# Patient Record
Sex: MALE | Race: WHITE | Employment: FULL TIME | URBAN - METROPOLITAN AREA
[De-identification: names, ages, dates, MRNs, and addresses within clinical notes are randomized per-mention and may not be internally consistent; named-entity substitution may affect disease eponyms.]

---

## 2021-07-25 ENCOUNTER — HOSPITAL ENCOUNTER (EMERGENCY)
Age: 21
Discharge: HOME | End: 2021-07-25
Attending: EMERGENCY MEDICINE
Payer: COMMERCIAL

## 2021-07-25 ENCOUNTER — APPOINTMENT (OUTPATIENT)
Dept: GENERAL RADIOLOGY | Age: 21
End: 2021-07-25
Payer: COMMERCIAL

## 2021-07-25 VITALS
SYSTOLIC BLOOD PRESSURE: 143 MMHG | RESPIRATION RATE: 14 BRPM | DIASTOLIC BLOOD PRESSURE: 88 MMHG | TEMPERATURE: 97.6 F | OXYGEN SATURATION: 98 % | HEART RATE: 71 BPM | HEIGHT: 73 IN

## 2021-07-25 DIAGNOSIS — R10.12 LEFT UPPER QUADRANT ABDOMINAL PAIN: Primary | ICD-10-CM

## 2021-07-25 LAB
ALBUMIN SERPL-MCNC: 4.4 GM/DL (ref 3.4–5)
ALP BLD-CCNC: 94 IU/L (ref 40–129)
ALT SERPL-CCNC: 48 U/L (ref 10–40)
ANION GAP SERPL CALCULATED.3IONS-SCNC: 14 MMOL/L (ref 4–16)
AST SERPL-CCNC: 34 IU/L (ref 15–37)
BASOPHILS ABSOLUTE: 0 K/CU MM
BASOPHILS RELATIVE PERCENT: 0.1 % (ref 0–1)
BILIRUB SERPL-MCNC: 0.8 MG/DL (ref 0–1)
BUN BLDV-MCNC: 12 MG/DL (ref 6–23)
CALCIUM SERPL-MCNC: 9.1 MG/DL (ref 8.3–10.6)
CHLORIDE BLD-SCNC: 103 MMOL/L (ref 99–110)
CO2: 23 MMOL/L (ref 21–32)
CREAT SERPL-MCNC: 0.8 MG/DL (ref 0.9–1.3)
DIFFERENTIAL TYPE: ABNORMAL
EOSINOPHILS ABSOLUTE: 0 K/CU MM
EOSINOPHILS RELATIVE PERCENT: 0.4 % (ref 0–3)
GFR AFRICAN AMERICAN: >60 ML/MIN/1.73M2
GFR NON-AFRICAN AMERICAN: >60 ML/MIN/1.73M2
GLUCOSE BLD-MCNC: 103 MG/DL (ref 70–99)
HCT VFR BLD CALC: 46.9 % (ref 42–52)
HEMOGLOBIN: 15.6 GM/DL (ref 13.5–18)
IMMATURE NEUTROPHIL %: 0.3 % (ref 0–0.43)
LIPASE: 15 IU/L (ref 13–60)
LYMPHOCYTES ABSOLUTE: 1.3 K/CU MM
LYMPHOCYTES RELATIVE PERCENT: 16.5 % (ref 24–44)
MCH RBC QN AUTO: 30.1 PG (ref 27–31)
MCHC RBC AUTO-ENTMCNC: 33.3 % (ref 32–36)
MCV RBC AUTO: 90.5 FL (ref 78–100)
MONOCYTES ABSOLUTE: 0.4 K/CU MM
MONOCYTES RELATIVE PERCENT: 4.6 % (ref 0–4)
PDW BLD-RTO: 12.5 % (ref 11.7–14.9)
PLATELET # BLD: 205 K/CU MM (ref 140–440)
PMV BLD AUTO: 10.2 FL (ref 7.5–11.1)
POTASSIUM SERPL-SCNC: 3.7 MMOL/L (ref 3.5–5.1)
RBC # BLD: 5.18 M/CU MM (ref 4.6–6.2)
SEGMENTED NEUTROPHILS ABSOLUTE COUNT: 6.1 K/CU MM
SEGMENTED NEUTROPHILS RELATIVE PERCENT: 78.1 % (ref 36–66)
SODIUM BLD-SCNC: 140 MMOL/L (ref 135–145)
TOTAL IMMATURE NEUTOROPHIL: 0.02 K/CU MM
TOTAL PROTEIN: 7.7 GM/DL (ref 6.4–8.2)
WBC # BLD: 7.8 K/CU MM (ref 4–10.5)

## 2021-07-25 PROCEDURE — 99283 EMERGENCY DEPT VISIT LOW MDM: CPT

## 2021-07-25 PROCEDURE — 85025 COMPLETE CBC W/AUTO DIFF WBC: CPT

## 2021-07-25 PROCEDURE — 83690 ASSAY OF LIPASE: CPT

## 2021-07-25 PROCEDURE — 80053 COMPREHEN METABOLIC PANEL: CPT

## 2021-07-25 PROCEDURE — 74022 RADEX COMPL AQT ABD SERIES: CPT

## 2021-07-25 NOTE — ED PROVIDER NOTES
EMERGENCY DEPARTMENT ENCOUNTER      CHIEF COMPLAINT:   Abdominal pain    HPI: Pio Samson is a 24 y.o. male who presents to the Emergency Department complaining of intermittent abdominal pain over the past 2 to 3 days. He states that he has an unusual sensation in the left upper abdomen. It feels like it is popping and gurgling at times. He states it feels empty. Symptoms are intermittent. There are no exacerbating or relieving factors. He is asymptomatic right now. He states he has a history of anxiety and is gotten anxious over this. The patient denies fevers, chills, hematemesis, bloody stools, flank pain, or any other complaints. REVIEW OF SYSTEMS:  CONSTITUTIONAL:  Denies fever, chills, weight loss or weakness  EYES:  Denies photophobia or discharge  ENT:  Denies sore throat or ear pain  CARDIOVASCULAR:  Denies chest pain, palpitations or swelling  RESPIRATORY:  Denies cough or shortness of breath  GI: See HPI  MUSCULOSKELETAL:  Denies back pain  SKIN:  No rash  NEUROLOGIC:  Denies headache, focal weakness or sensory changes  All systems negative except as marked. \"Remaining review of systems reviewed and negative. I have reviewed the nursing triage documentation and agree unless otherwise noted below. \"    PAST MEDICAL HISTORY:   Past Medical History:   Diagnosis Date    Asthma     Celiac disease        CURRENT MEDICATIONS:   Home medications reviewed. SURGICAL HISTORY:   Past Surgical History:   Procedure Laterality Date    CHOLECYSTECTOMY         FAMILY HISTORY:   History reviewed. No pertinent family history.     SOCIAL HISTORY:   Social History     Socioeconomic History    Marital status: Single     Spouse name: Not on file    Number of children: Not on file    Years of education: Not on file    Highest education level: Not on file   Occupational History    Not on file   Tobacco Use    Smoking status: Never Smoker    Smokeless tobacco: Never Used   Vaping Use    Vaping Use: Never used   Substance and Sexual Activity    Alcohol use: Yes     Comment: admits to drinking 5 beers tonight    Drug use: No    Sexual activity: Not on file   Other Topics Concern    Not on file   Social History Narrative    Not on file     Social Determinants of Health     Financial Resource Strain:     Difficulty of Paying Living Expenses:    Food Insecurity:     Worried About Running Out of Food in the Last Year:     [address] in the Last Year:    Transportation Needs:     Lack of Transportation (Medical):  Lack of Transportation (Non-Medical):    Physical Activity:     Days of Exercise per Week:     Minutes of Exercise per Session:    Stress:     Feeling of Stress :    Social Connections:     Frequency of Communication with Friends and Family:     Frequency of Social Gatherings with Friends and Family:     Attends Protestant Services:     Active Member of Clubs or Organizations:     Attends Club or Organization Meetings:     Marital Status:    Intimate Partner Violence:     Fear of Current or Ex-Partner:     Emotionally Abused:     Physically Abused:     Sexually Abused: ALLERGIES: Gluten meal    PHYSICAL EXAM:  VITAL SIGNS:   ED Triage Vitals [07/25/21 0827]   Enc Vitals Group      BP       Pulse 71      Resp       Temp 97.6 °F (36.4 °C)      Temp Source Oral      SpO2       Weight       Height       Head Circumference       Peak Flow       Pain Score       Pain Loc       Pain Edu? Excl. in [address]? Constitutional:  Non-toxic appearance  HENT: Normocephalic, Atraumatic, Bilateral external ears normal, Oropharynx moist, No oral exudates, Nose normal.  Eyes: PERRL, conjunctiva normal   Neck: Normal range of motion, No tenderness, Supple, No stridor,No lymphadenopathy   Cardiovascular:  Normal heart rate, Normal rhythm  Pulmonary/Chest:  Normal breath sounds, No respiratory distress, No wheezing  Abdomen:   Bowel sounds normal, Soft, No tenderness, No masses, No pulsatile masses  Back:  No tenderness, No CVA tenderness  Extremities:  Normal range of motion, Intact distal pulses, No edema, No tenderness  Skin:  Warm, Dry, No erythema, No rash      EKG:    None    Radiology / Procedures:  XR ACUTE ABD SERIES CHEST 1 VW (Final result)  Result time 07/25/21 09:38:00  Final result by Lorne Greco MD (07/25/21 09:38:00)                Impression:    1. Nonobstructive bowel gas pattern. 2. No acute cardiopulmonary findings. Narrative:    EXAMINATION:   TWO XRAY VIEWS OF THE ABDOMEN AND SINGLE  XRAY VIEW OF THE CHEST     7/25/2021 8:51 am     COMPARISON:   Chest radiograph performed March 26, 2016     HISTORY:   ORDERING SYSTEM PROVIDED HISTORY: Abdominal pain   TECHNOLOGIST PROVIDED HISTORY:   Reason for exam:->Abdominal pain   Reason for Exam: Abdominal pain   Acuity: Acute   Type of Exam: Initial   Additional signs and symptoms: left mid abd pain, pt states may have pulled   something while working on a tractor   Relevant Medical/Surgical History: hx elo     FINDINGS:   No focal consolidation, pleural effusion, or pneumothorax.  Stable   cardiomediastinal silhouette.  Nonobstructive bowel gas pattern.  Right upper   quadrant surgical clips.  No acute osseous abnormality. Labs Reviewed   CBC WITH AUTO DIFFERENTIAL - Abnormal; Notable for the following components:       Result Value    Segs Relative 78.1 (*)     Lymphocytes % 16.5 (*)     Monocytes % 4.6 (*)     All other components within normal limits   COMPREHENSIVE METABOLIC PANEL - Abnormal; Notable for the following components:    CREATININE 0.8 (*)     Glucose 103 (*)     ALT 48 (*)     All other components within normal limits   LIPASE       ED COURSE & MEDICAL DECISION MAKING:  Pertinent Labs & Imaging studies reviewed. (See chart for details)  On exam, the patient is afebrile and nontoxic appearing.   Blood pressure is mildly elevated with no history of hypertension and so he is instructed to have this rechecked as an outpatient. He is asymptomatic with this. He is otherwise hemodynamically stable and neurologically intact. Labs are obtained and there are no clinically significant lab abnormalities. Abdominal x-rays are negative. The etiology of the patient's left upper quadrant abdominal discomfort is unclear. The patient attributes it to his anxiety and this may be the case. . I have a low suspicion for acute appendicitis, intraabdominal abscess, perforation, bowel obstruction, AAA, dissection, ischemic bowel, or acute surgical abdomen. I feel that the patient is stable for outpatient management with follow up in 2-3 days. He is to return to the Emergency Department immediately for increased abdominal pain, fevers, vomiting, vomiting blood, or for any other concerns. The patient verbalized understanding, was agreeable with plan, and the patient was discharged home in stable condition. Clinical Impression:  1. Left upper quadrant abdominal pain        Disposition referral (if applicable):  Gayle Russell MD  [address] [facility]  [phone]    Schedule an appointment as soon as possible for a visit in 2 days      [address]  [phone]  Go to   If symptoms worsen      Disposition medications (if applicable):  Discharge Medication List as of 7/25/2021  9:53 AM            Comment: Please note this report has been produced using speech recognition software and may contain errors related to that system including errors in grammar, punctuation, and spelling, as well as words and phrases that may be inappropriate. If there are any questions or concerns please feel free to contact the dictating provider for clarification.         Adair San MD  07/25/21 7801

## 2021-09-14 ENCOUNTER — HOSPITAL ENCOUNTER (EMERGENCY)
Age: 21
Discharge: HOME | End: 2021-09-14
Attending: EMERGENCY MEDICINE
Payer: COMMERCIAL

## 2021-09-14 ENCOUNTER — APPOINTMENT (OUTPATIENT)
Dept: GENERAL RADIOLOGY | Age: 21
End: 2021-09-14
Payer: COMMERCIAL

## 2021-09-14 VITALS
BODY MASS INDEX: 37.77 KG/M2 | SYSTOLIC BLOOD PRESSURE: 144 MMHG | OXYGEN SATURATION: 99 % | RESPIRATION RATE: 14 BRPM | HEART RATE: 67 BPM | DIASTOLIC BLOOD PRESSURE: 88 MMHG | TEMPERATURE: 97.5 F | HEIGHT: 73 IN | WEIGHT: 285 LBS

## 2021-09-14 DIAGNOSIS — S62.366A CLOSED NONDISPLACED FRACTURE OF NECK OF FIFTH METACARPAL BONE OF RIGHT HAND, INITIAL ENCOUNTER: Primary | ICD-10-CM

## 2021-09-14 PROCEDURE — 99283 EMERGENCY DEPT VISIT LOW MDM: CPT

## 2021-09-14 PROCEDURE — 6370000000 HC RX 637 (ALT 250 FOR IP): Performed by: EMERGENCY MEDICINE

## 2021-09-14 PROCEDURE — 73130 X-RAY EXAM OF HAND: CPT

## 2021-09-14 RX ORDER — NAPROXEN 500 MG/1
500 TABLET ORAL ONCE
Status: COMPLETED | OUTPATIENT
Start: 2021-09-14 | End: 2021-09-14

## 2021-09-14 RX ORDER — HYDROCODONE BITARTRATE AND ACETAMINOPHEN 5; 325 MG/1; MG/1
1 TABLET ORAL EVERY 6 HOURS PRN
Qty: 10 TABLET | Refills: 0 | Status: SHIPPED | OUTPATIENT
Start: 2021-09-14 | End: 2021-09-17

## 2021-09-14 RX ORDER — ACETAMINOPHEN 500 MG
1000 TABLET ORAL ONCE
Status: COMPLETED | OUTPATIENT
Start: 2021-09-14 | End: 2021-09-14

## 2021-09-14 RX ORDER — NAPROXEN 500 MG/1
500 TABLET ORAL 2 TIMES DAILY
Qty: 60 TABLET | Refills: 0 | Status: SHIPPED | OUTPATIENT
Start: 2021-09-14 | End: 2022-07-11

## 2021-09-14 RX ADMIN — NAPROXEN 500 MG: 500 TABLET ORAL at 07:24

## 2021-09-14 RX ADMIN — ACETAMINOPHEN 1000 MG: 500 TABLET, FILM COATED ORAL at 07:24

## 2021-09-14 ASSESSMENT — PAIN DESCRIPTION - ORIENTATION: ORIENTATION: RIGHT

## 2021-09-14 ASSESSMENT — PAIN DESCRIPTION - PAIN TYPE: TYPE: ACUTE PAIN

## 2021-09-14 ASSESSMENT — PAIN SCALES - GENERAL: PAINLEVEL_OUTOF10: 6

## 2021-09-14 ASSESSMENT — PAIN DESCRIPTION - LOCATION: LOCATION: HAND

## 2021-09-14 NOTE — ED NOTES
Ulnar gutter splint placed on rt arm, checked off by Dr Stephanie Rodriguez.      Mata Lebron RN  09/14/21 6186

## 2021-09-14 NOTE — ED NOTES
Pt reports he got mad and punched a wall 2 days ago, c/o rt hand swelling and pain.      Lindsey Ochoa RN  09/14/21 1353

## 2021-09-14 NOTE — ED PROVIDER NOTES
Triage Chief Complaint:   Hand Injury (rt lateral hand injury after punching a wall)    Craig:  Michelle Gautam is a 24 y.o. left-hand-dominant male that presents with right hand injury. Patient reports he is in baseline state of health until Saturday night when he punched a wall with his right hand. Patient has been having pain along the fourth and fifth metacarpals since the punching incident. Patient has had persistent pain and swelling. Pain is currently 6 out of 10, constant and aching. No radiation of pain up the arm. No numbness or weakness. Patient still is able to wiggle all of his fingers however he does report pain with making a fist or straightening his fingers all the way. No numbness or weakness. No other issues at this time. Patient did not take anything for his pain today. ROS:  General:  No fevers, no chills  Respiratory:  No shortness of breath  Neurologic:  No numbness, no weakness  Extremities:  + Hand edema, + pain  Skin:  No rash  Psych: No axienty    Past Medical History:   Diagnosis Date    Asthma     Celiac disease      Past Surgical History:   Procedure Laterality Date    CHOLECYSTECTOMY       History reviewed. No pertinent family history.   Social History     Socioeconomic History    Marital status: Single     Spouse name: Not on file    Number of children: Not on file    Years of education: Not on file    Highest education level: Not on file   Occupational History    Not on file   Tobacco Use    Smoking status: Never Smoker    Smokeless tobacco: Never Used   Vaping Use    Vaping Use: Never used   Substance and Sexual Activity    Alcohol use: Yes     Comment: admits to drinking 5 beers tonight    Drug use: No    Sexual activity: Not on file   Other Topics Concern    Not on file   Social History Narrative    Not on file     Social Determinants of Health     Financial Resource Strain:     Difficulty of Paying Living Expenses:    Food Insecurity:     Worried About Running Out of Food in the Last Year:     [place] of Food in the Last Year:    Transportation Needs:     Lack of Transportation (Medical):  Lack of Transportation (Non-Medical):    Physical Activity:     Days of Exercise per Week:     Minutes of Exercise per Session:    Stress:     Feeling of Stress :    Social Connections:     Frequency of Communication with Friends and Family:     Frequency of Social Gatherings with Friends and Family:     Attends Nondenominational Services:     Active Member of Clubs or Organizations:     Attends Club or Organization Meetings:     Marital Status:    Intimate Partner Violence:     Fear of Current or Ex-Partner:     Emotionally Abused:     Physically Abused:     Sexually Abused:      No current facility-administered medications for this encounter. Current Outpatient Medications   Medication Sig Dispense Refill    naproxen (NAPROSYN) 500 MG tablet Take 1 tablet by mouth 2 times daily 60 tablet 0    HYDROcodone-acetaminophen (NORCO) 5-325 MG per tablet Take 1 tablet by mouth every 6 hours as needed for Pain for up to 3 days. Intended supply: 3 days. Take lowest dose possible to manage pain 10 tablet 0    Multiple Vitamins-Minerals (THERAPEUTIC MULTIVITAMIN-MINERALS) tablet Take 1 tablet by mouth daily      ibuprofen (ADVIL;MOTRIN) 800 MG tablet Take 1 tablet by mouth every 8 hours as needed for Pain 24 tablet 0     Allergies   Allergen Reactions    Gluten Meal        Nursing Notes Reviewed    Physical Exam:  ED Triage Vitals [09/14/21 0701]   Enc Vitals Group      BP (!) 144/88      Pulse 67      Resp 14      Temp 97.5 °F (36.4 °C)      Temp src       SpO2 99 %      Weight 285 lb (129.3 kg)      Height 6' 1\" (1.854 m)      Head Circumference       Peak Flow       Pain Score       Pain Loc       Pain Edu? Excl. in [address]? My pulse ox interpretation is - normal    General appearance:  No acute distress. Sitting comfortably in bed. Skin:  Warm. Dry. There are no abrasions or lacerations noted to patient's affected right hand. Eye:  Extraocular movements intact. Ears, nose, mouth and throat:  Oral mucosa moist   Extremity:  No swelling other than right hand. Normal ROM including the right hand albeit with some pain. RUE: There is some edema to the right hand along the fourth and fifth meta carpals but otherwise no gross deformity. Normal range of motion at the right shoulder, right elbow and right wrist.  Patient can wiggle fingers albeit with some pain. Sensation is intact globally light touch to the affected right hand. Strength is somewhat limited to gripping secondary to pain but patient is able to make a fist.  There is point tenderness palpation along patient's fourth and fifth metacarpals reproducing pain with associated edema as above. Strong right radial pulse and brisk capillary refill throughout affected hand. No tenderness throughout the snuffbox or wrist.  No more proximal tenderness of the arm. Heart:  Strong and symmetric peripheral pulses. Extremities are well perfused. Abdomen:  Non-distended. Respiratory:  Respirations nonlabored. Neurological:  Alert and oriented times 3. No focal neuro deficits. Sensation intact to light touch to distal upper/lower extremities. Ambulatory with a steady gait. I have reviewed and interpreted all of the currently available lab results from this visit (if applicable):  No results found for this visit on 09/14/21. Radiographs (if obtained):  [] The following radiograph was interpreted by myself in the absence of a radiologist:   [x] Radiologist's Report Reviewed:  XR HAND RIGHT (MIN 3 VIEWS)   Final Result   Acute distal 5th metacarpal fracture. EKG (if obtained): (All EKG's are interpreted by myself in the absence of a cardiologist)    Chart review shows recent radiographs:  No results found. MDM:  Pt presents as above. Emergent conditions considered. Presentation prompted initial x-ray as above. Tylenol naproxen given for pain. Ice pack is applied. X-ray demonstrating an acute distal fifth metacarpal fracture. No significant displacement on my read. Patient is placed in an ulnar gutter splint. Short course of Norco and naproxen for home. Orthopedic follow-up is stressed and patient is planning on calling today to set up an appointment. Splint care precautions are discussed. I discussed specific signs and symptoms and when to return to the emergency department as well as need for close outpatient follow-up. Questions sought and answered with the patient. They voice understanding and agree with plan. Clinical Impression:  1. Closed nondisplaced fracture of neck of fifth metacarpal bone of right hand, initial encounter      Disposition referral (if applicable):  Jarett De Dios MD  [address] [address] [place]   [phone]    Schedule an appointment as soon as possible for a visit       [facility], [address] [id number]    Schedule an appointment as soon as possible for a visit       [address] [facility]  [phone]  Today  If symptoms worsen    Disposition medications (if applicable):  New Prescriptions    HYDROCODONE-ACETAMINOPHEN (NORCO) 5-325 MG PER TABLET    Take 1 tablet by mouth every 6 hours as needed for Pain for up to 3 days. Intended supply: 3 days. Take lowest dose possible to manage pain    NAPROXEN (NAPROSYN) 500 MG TABLET    Take 1 tablet by mouth 2 times daily       Comment: Please note this report has been produced using speech recognition software and may contain errors related to that system including errors in grammar, punctuation, and spelling, as well as words and phrases that may be inappropriate.  If there are any questions or concerns please feel free to contact the dictating provider for clarification.          Donnie Major MD  09/14/21 7029

## 2021-09-24 ENCOUNTER — OFFICE VISIT (OUTPATIENT)
Dept: ORTHOPEDIC SURGERY | Age: 21
End: 2021-09-24
Payer: COMMERCIAL

## 2021-09-24 VITALS
WEIGHT: 285 LBS | OXYGEN SATURATION: 98 % | HEIGHT: 73 IN | RESPIRATION RATE: 15 BRPM | HEART RATE: 66 BPM | BODY MASS INDEX: 37.77 KG/M2

## 2021-09-24 DIAGNOSIS — S62.398A: Primary | ICD-10-CM

## 2021-09-24 PROCEDURE — 99203 OFFICE O/P NEW LOW 30 MIN: CPT | Performed by: PHYSICIAN ASSISTANT

## 2021-09-24 PROCEDURE — 26600 TREAT METACARPAL FRACTURE: CPT | Performed by: PHYSICIAN ASSISTANT

## 2021-09-24 NOTE — PROGRESS NOTES
Review of Systems   Constitutional: Negative. HENT: Negative. Eyes: Negative. Respiratory: Negative. Cardiovascular: Negative. Gastrointestinal: Negative. Genitourinary: Negative. Musculoskeletal: Positive for arthralgias, joint swelling and myalgias. Skin: Negative. Neurological: Negative. Psychiatric/Behavioral: Negative. Cinda Fatima is a 24 y.o. Patient presents to the office today for ED FU of the distal 5th metacarpal fx DOI 9/14/21. Pt states she did punch a wall about two weeks ago. Pt states that he is having a lot of pain in the 5th finger. Pt states he is not able to close his fist do to the pain. Past Medical History:   Diagnosis Date    Asthma     Celiac disease        Past Surgical History:   Procedure Laterality Date    CHOLECYSTECTOMY         No family history on file. Social History     Socioeconomic History    Marital status: Single     Spouse name: Not on file    Number of children: Not on file    Years of education: Not on file    Highest education level: Not on file   Occupational History    Not on file   Tobacco Use    Smoking status: Never Smoker    Smokeless tobacco: Never Used   Vaping Use    Vaping Use: Never used   Substance and Sexual Activity    Alcohol use: Yes     Comment: admits to drinking 5 beers tonight    Drug use: No    Sexual activity: Not on file   Other Topics Concern    Not on file   Social History Narrative    Not on file     Social Determinants of Health     Financial Resource Strain:     Difficulty of Paying Living Expenses:    Food Insecurity:     Worried About [address] in the Last Year:     [address] in the Last Year:    Transportation Needs:     Lack of Transportation (Medical):      Lack of Transportation (Non-Medical):    Physical Activity:     Days of Exercise per Week:     Minutes of Exercise per Session:    Stress:     Feeling of Stress :    Social Connections:     No obvious deformity other than mild loss of knuckle prominence of the fifth MCP joint  Palpation: Mild to moderate tenderness palpation of the fifth metacarpal.  Range of motion: Mild reduction in flexion of the fifth digit. Strength: Not tested        Outsiderecord review: ER note and x-rays reviewed    Imaging studies:  3 views of the right hand taken reviewed in the office today show a mildly displaced and angulated fracture of the fifth metacarpal neck. No appreciable interval displacement or interval healing noted compared to x-rays taken on 9/14/2021. Impression:     Diagnosis Orders   1. Other closed nondisplaced fracture of fifth metacarpal bone, initial encounter             Plan:    Patient fitted for knuckle orthosis brace.   Patient Instructions   Fitted for a brace today  Avoid any lifting pushing pulling of the right hand  Follow up in 3 weeks

## 2021-10-12 ENCOUNTER — TELEPHONE (OUTPATIENT)
Dept: ORTHOPEDIC SURGERY | Age: 21
End: 2021-10-12

## 2021-10-13 ENCOUNTER — OFFICE VISIT (OUTPATIENT)
Dept: ORTHOPEDIC SURGERY | Age: 21
End: 2021-10-13

## 2021-10-13 VITALS
HEIGHT: 73 IN | WEIGHT: 285 LBS | RESPIRATION RATE: 16 BRPM | HEART RATE: 71 BPM | BODY MASS INDEX: 37.77 KG/M2 | OXYGEN SATURATION: 98 %

## 2021-10-13 DIAGNOSIS — S62.398A: Primary | ICD-10-CM

## 2021-10-13 PROCEDURE — 99024 POSTOP FOLLOW-UP VISIT: CPT | Performed by: PHYSICIAN ASSISTANT

## 2021-10-13 NOTE — PATIENT INSTRUCTIONS
Follow-up in 6 weeks for repeat x-ray  Continue using brace for the next 2 weeks but come out of the brace while at home and work on range of motion of the hand. After 2 weeks you may discontinue the brace altogether.

## 2021-10-13 NOTE — PROGRESS NOTES
History:  Mr. Za Paige is here in follow up regarding his right hand fifth metacarpal neck fracture. He is wearing a brace as instructed. He has some pain still and some loss of motion but overall he is improving. Physical:  Vitals:    10/13/21 1110   Pulse: 71   Resp: 16   SpO2: 98%   Weight: 285 lb (129.3 kg)   Height: 6' 1\" (1.854 m)     Right hand:  Mild loss of prominence of the knuckle of the 5th MCP joint  No significant tenderness to palpation at the fracture site. No crossover sign  Mild loss of flexion due to immobilization. Impression: Status post right hand fifth metacarpal neck fracture    Plan:   Patient Instructions   Follow-up in 6 weeks for repeat x-ray  Continue using brace for the next 2 weeks but come out of the brace while at home and work on range of motion of the hand. After 2 weeks you may discontinue the brace altogether.

## 2021-10-25 ASSESSMENT — ENCOUNTER SYMPTOMS
EYES NEGATIVE: 1
RESPIRATORY NEGATIVE: 1
GASTROINTESTINAL NEGATIVE: 1

## 2022-07-11 ENCOUNTER — HOSPITAL ENCOUNTER (EMERGENCY)
Age: 22
Discharge: HOME | End: 2022-07-11
Attending: EMERGENCY MEDICINE
Payer: COMMERCIAL

## 2022-07-11 VITALS
RESPIRATION RATE: 16 BRPM | OXYGEN SATURATION: 99 % | HEART RATE: 87 BPM | TEMPERATURE: 98.7 F | DIASTOLIC BLOOD PRESSURE: 88 MMHG | SYSTOLIC BLOOD PRESSURE: 137 MMHG | BODY MASS INDEX: 40.63 KG/M2 | HEIGHT: 72 IN | WEIGHT: 300 LBS

## 2022-07-11 DIAGNOSIS — F41.1 ANXIETY STATE: Primary | ICD-10-CM

## 2022-07-11 DIAGNOSIS — R20.2 PARESTHESIA: ICD-10-CM

## 2022-07-11 PROCEDURE — 99283 EMERGENCY DEPT VISIT LOW MDM: CPT

## 2022-07-11 PROCEDURE — 6370000000 HC RX 637 (ALT 250 FOR IP): Performed by: EMERGENCY MEDICINE

## 2022-07-11 RX ORDER — HYDROXYZINE 50 MG/1
50 TABLET, FILM COATED ORAL 3 TIMES DAILY PRN
Qty: 20 TABLET | Refills: 0 | Status: SHIPPED | OUTPATIENT
Start: 2022-07-11 | End: 2022-07-21

## 2022-07-11 RX ORDER — HYDROXYZINE PAMOATE 25 MG/1
50 CAPSULE ORAL ONCE
Status: COMPLETED | OUTPATIENT
Start: 2022-07-11 | End: 2022-07-11

## 2022-07-11 RX ADMIN — HYDROXYZINE PAMOATE 50 MG: 25 CAPSULE ORAL at 12:38

## 2022-07-11 ASSESSMENT — PAIN - FUNCTIONAL ASSESSMENT: PAIN_FUNCTIONAL_ASSESSMENT: NONE - DENIES PAIN

## 2022-07-11 NOTE — ED PROVIDER NOTES
Emergency Department Encounter    Patient: Michoacano Emery  MRN: [id number]  : 2000  Date of Evaluation: 2022  ED Provider:  Sallie Osuna MD    Triage Chief Complaint:   Tingling (Pt reports tingling to bilateral cheeks and hands that started about 2 hours PTA. Pt has had this happen in the past \"because of anxiety\" but states it will usually subside quickly. Pt denies pain. Ambulated to bed per self, AOx4, breathing unlabored, skin warm and dry)    Clinical Impression:  1. Anxiety state    2. Paresthesia      Disposition referral (if applicable):  Cristiana Mancera MD  [address] [facility] [address]   [phone]    In 2 days      ED Provider Disposition Time  DISPOSITION Decision To Discharge 2022 12:48:46 PM       MDM:  Patient presents with paresthesias and feelings of anxiety as below. Patient denied any suicidal or homicidal ideations. Patient is not deemed to be an acute risk to himself or others. Patient had no numbness or weakness or other focal neurologic deficits to include changes in vision/speech/swallowing. Patient has a nonfocal normal neurologic exam.  The rest the exam is benign as below. Patient was treated with hydroxyzine with improvement in symptoms on reevaluation. No evidence of a CVA or TIA. Symptoms are most consistent with anxiety state. As patient had improvement in symptoms with treatment of anxiety, I do not suspect significant electrolyte abnormality. Patient will be discharged with strict return precautions and follow-up instructions. Patient verbalized understanding and agreement with plan.     Medications ordered in the ED:  ED Medication Orders (From admission, onward)    Start Ordered     Status Ordering Provider    22 1245 22 1237  hydrOXYzine pamoate (VISTARIL) capsule 50 mg  ONCE         Last MAR action: Given - by Carly Almonte on 22 at [address] [place] [address]          Disposition medications (if applicable):  New Prescriptions HYDROXYZINE HCL (ATARAX) 50 MG TABLET    Take 1 tablet by mouth 3 times daily as needed for Anxiety         HPI:  Nandini Martinez is a 25 y.o. male that presents complaining of 1 day history of intermittent paresthesias involving the perioral region and bilateral upper extremities. Patient states that currently he is feeling some paresthesias in the perioral region but that the paresthesias of the bilateral upper extremities has resolved. Patient states that he does feel the symptoms intermittently associated with his anxiety. Patient states that he does feel anxious. No paresthesias in the lower extremities. No facial droop or changes in speech or vision. No weakness. Patient denies any suicidal or homicidal ideations. No chest pain or shortness of breath. No abdominal pain, nausea, vomiting, diarrhea, urinary symptoms. No changes in vision/speech/swallowing. Symptoms are moderate in severity. No known alleviating or exacerbating factors. Patient states that he does not take any medication for anxiety. ROS - see HPI, below listed is current ROS at time of my eval:  General:  No fevers  Eyes:  No eye discharge  ENT:  No ear discharge  Cardiovascular:  No palpitations  Respiratory:  No wheezing  Gastrointestinal:  No hematemesis  Musculoskeletal:  No muscle pain  Skin:  No purpura  Neurologic:  No headache  Psychiatric:  No homicidal ideation  Genitourinary:  No hematuria  Endocrine:  No unexpected weight gain  Extremities:  No edema    Physical Exam:  Triage VS:    ED Triage Vitals [07/11/22 1226]   Enc Vitals Group      BP (!) 161/104      Heart Rate 95      Resp 16      Temp 98.7 °F (37.1 °C)      Temp src       SpO2 99 %      Weight 300 lb (136.1 kg)      Height 6' (1.829 m)      Head Circumference       Peak Flow       Pain Score       Pain Loc       Pain Edu? Excl. in [address]? General appearance:  No acute distress. Skin:  Warm. Dry. Eye:  Extraocular movements intact. Ears, nose, mouth and throat:  Oral mucosa moist   Neck:  Trachea midline. Extremity:  No swelling. Normal ROM     Heart:  Regular rate and rhythm, normal S1 & S2, no extra heart sounds. Perfusion:  intact  Respiratory:  Lungs clear to auscultation bilaterally. Respirations nonlabored. Abdominal:  Normal bowel sounds. Soft. Nontender. Non distended. Neurological:  Alert and oriented times 3. No focal neuro deficits. Cranial nerves II through XII grossly intact. Strength 5 out of 5 throughout. Sensation intact to light touch throughout. Psychiatric:  Appropriate    Past Medical History:   Diagnosis Date    Anxiety     Asthma     Celiac disease      Past Surgical History:   Procedure Laterality Date    CHOLECYSTECTOMY       History reviewed. No pertinent family history. Social History     Socioeconomic History    Marital status: Single     Spouse name: Not on file    Number of children: Not on file    Years of education: Not on file    Highest education level: Not on file   Occupational History    Not on file   Tobacco Use    Smoking status: Never Smoker    Smokeless tobacco: Current User     Types: Chew   Vaping Use    Vaping Use: Never used   Substance and Sexual Activity    Alcohol use: Yes     Comment: small amount daily    Drug use: No    Sexual activity: Not on file   Other Topics Concern    Not on file   Social History Narrative    Not on file     Social Determinants of Health     Financial Resource Strain:     Difficulty of Paying Living Expenses: Not on file   Food Insecurity:     Worried About Running Out of Food in the Last Year: Not on file    [place] of Food in the Last Year: Not on file   Transportation Needs:     Lack of Transportation (Medical): Not on file    Lack of Transportation (Non-Medical):  Not on file   Physical Activity:     Days of Exercise per Week: Not on file    Minutes of Exercise per Session: Not on file   Stress:     Feeling of Stress : Not on file   Social Connections:     Frequency of Communication with Friends and Family: Not on file    Frequency of Social Gatherings with Friends and Family: Not on file    Attends Caodaism Services: Not on file    Active Member of Clubs or Organizations: Not on file    Attends Club or Organization Meetings: Not on file    Marital Status: Not on file   Intimate Partner Violence:     Fear of Current or Ex-Partner: Not on file    Emotionally Abused: Not on file    Physically Abused: Not on file    Sexually Abused: Not on file   Housing Stability:     Unable to Pay for Housing in the Last Year: Not on file    Number of [place] in the Last Year: Not on file    Unstable Housing in the Last Year: Not on file     No current facility-administered medications for this encounter. Current Outpatient Medications   Medication Sig Dispense Refill    hydrOXYzine HCl (ATARAX) 50 MG tablet Take 1 tablet by mouth 3 times daily as needed for Anxiety 20 tablet 0     Allergies   Allergen Reactions    Gluten Meal     Wheat Bran        Nursing Notes Reviewed    I have reviewed and interpreted all of the currently available lab results from this visit (if applicable):  No results found for this visit on 07/11/22. Radiographs (if obtained):  Radiologist's Report Reviewed:  No orders to display         Comment: Please note this report has been produced using speech recognition software and may contain errors related to that system including errors in grammar, punctuation, and spelling, as well as words and phrases that may be inappropriate. Efforts were made to edit the dictations.         Tera Burch MD  07/11/22 6046

## 2022-07-11 NOTE — ED NOTES
Discharge instructions given, pt informed prescription was sent to pharmacy. Pt voiced understanding. Ambulatory to exit without incident.       Meg Wright RN  07/11/22 9895

## 2023-01-30 ENCOUNTER — APPOINTMENT (OUTPATIENT)
Dept: GENERAL RADIOLOGY | Age: 23
End: 2023-01-30
Payer: COMMERCIAL

## 2023-01-30 ENCOUNTER — HOSPITAL ENCOUNTER (EMERGENCY)
Age: 23
Discharge: HOME | End: 2023-01-30
Attending: EMERGENCY MEDICINE
Payer: COMMERCIAL

## 2023-01-30 VITALS
SYSTOLIC BLOOD PRESSURE: 126 MMHG | HEART RATE: 66 BPM | RESPIRATION RATE: 18 BRPM | DIASTOLIC BLOOD PRESSURE: 76 MMHG | WEIGHT: 315 LBS | OXYGEN SATURATION: 97 % | HEIGHT: 72 IN | BODY MASS INDEX: 42.66 KG/M2 | TEMPERATURE: 97 F

## 2023-01-30 DIAGNOSIS — S61.221A LACERATION OF LEFT INDEX FINGER WITH FOREIGN BODY WITHOUT DAMAGE TO NAIL, INITIAL ENCOUNTER: Primary | ICD-10-CM

## 2023-01-30 PROCEDURE — 6360000002 HC RX W HCPCS: Performed by: EMERGENCY MEDICINE

## 2023-01-30 PROCEDURE — 90471 IMMUNIZATION ADMIN: CPT | Performed by: EMERGENCY MEDICINE

## 2023-01-30 PROCEDURE — 2500000003 HC RX 250 WO HCPCS: Performed by: EMERGENCY MEDICINE

## 2023-01-30 PROCEDURE — 12002 RPR S/N/AX/GEN/TRNK2.6-7.5CM: CPT

## 2023-01-30 PROCEDURE — 96372 THER/PROPH/DIAG INJ SC/IM: CPT

## 2023-01-30 PROCEDURE — 6370000000 HC RX 637 (ALT 250 FOR IP): Performed by: EMERGENCY MEDICINE

## 2023-01-30 PROCEDURE — 73140 X-RAY EXAM OF FINGER(S): CPT

## 2023-01-30 PROCEDURE — 90715 TDAP VACCINE 7 YRS/> IM: CPT | Performed by: EMERGENCY MEDICINE

## 2023-01-30 PROCEDURE — 99284 EMERGENCY DEPT VISIT MOD MDM: CPT

## 2023-01-30 RX ORDER — GINSENG 100 MG
CAPSULE ORAL ONCE
Status: COMPLETED | OUTPATIENT
Start: 2023-01-30 | End: 2023-01-30

## 2023-01-30 RX ORDER — LIDOCAINE HYDROCHLORIDE 10 MG/ML
5 INJECTION, SOLUTION EPIDURAL; INFILTRATION; INTRACAUDAL; PERINEURAL ONCE
Status: COMPLETED | OUTPATIENT
Start: 2023-01-30 | End: 2023-01-30

## 2023-01-30 RX ORDER — KETOROLAC TROMETHAMINE 30 MG/ML
15 INJECTION, SOLUTION INTRAMUSCULAR; INTRAVENOUS ONCE
Status: COMPLETED | OUTPATIENT
Start: 2023-01-30 | End: 2023-01-30

## 2023-01-30 RX ADMIN — KETOROLAC TROMETHAMINE 15 MG: 30 INJECTION, SOLUTION INTRAMUSCULAR at 13:39

## 2023-01-30 RX ADMIN — BACITRACIN: 500 OINTMENT TOPICAL at 15:49

## 2023-01-30 RX ADMIN — TETANUS TOXOID, REDUCED DIPHTHERIA TOXOID AND ACELLULAR PERTUSSIS VACCINE, ADSORBED 0.5 ML: 5; 2.5; 8; 8; 2.5 SUSPENSION INTRAMUSCULAR at 14:03

## 2023-01-30 RX ADMIN — LIDOCAINE HYDROCHLORIDE 5 ML: 10 INJECTION, SOLUTION EPIDURAL; INFILTRATION; INTRACAUDAL; PERINEURAL at 14:46

## 2023-01-30 ASSESSMENT — PAIN - FUNCTIONAL ASSESSMENT: PAIN_FUNCTIONAL_ASSESSMENT: 0-10

## 2023-01-30 ASSESSMENT — PAIN DESCRIPTION - LOCATION: LOCATION: FINGER (COMMENT WHICH ONE)

## 2023-01-30 ASSESSMENT — PAIN SCALES - GENERAL: PAINLEVEL_OUTOF10: 10

## 2023-01-30 ASSESSMENT — PAIN DESCRIPTION - DESCRIPTORS: DESCRIPTORS: THROBBING

## 2023-01-30 NOTE — DISCHARGE INSTRUCTIONS
Return immediately to the emergency department if you experience new or worsened symptoms, increased pain, changes in color sensation of the finger, spreading redness, or for any other concerns

## 2023-01-30 NOTE — ED PROVIDER NOTES
Emergency Department Encounter    Patient: Anders Lopes  MRN: [id number]  : 2000  Date of Evaluation: 2023  ED Provider:  Jovany Perla MD    MDM:    Clinical Impression:  1. Laceration of left index finger with foreign body without damage to nail, initial encounter          Triage Chief Complaint: Laceration      Right-hand-dominant patient presents with left finger laceration as below    Additional history was obtained from: Patient    I completed a structured, evidence-based clinical evaluation to screen for acute emergent condition that poses a threat to life or bodily function.      Diagnostic studies/Differential diagnosis included: Left finger x-ray as interpreted by me reveals no evidence of acute fracture or dislocation.  There were small foreign bodies suggested.  Patient was treated with irrigation.  No visible foreign bodies on repair.  Laceration repaired as below.  Patient is neurovascularly intact.  No evidence of tendon disruption on exam or through functional testing.  Tetanus was updated in the emergency department.  Patient treated as below with improvement on reevaluation.    Medications ordered in the ED:  ED Medication Orders (From admission, onward)      Start Ordered     Status Ordering Provider    23 1545 23 1538  bacitracin ointment  ONCE         Last MAR action: Given - by PEARL COSME on 23 at 1549 JOVANY PERLA    23 1445 23 1443  lidocaine PF 1 % injection 5 mL  ONCE         Last MAR action: Given - by PEARL COSME on 23 at 1446 JOVANY PERLA    23 1415 23 1400  tetanus-diphth-acell pertussis (BOOSTRIX) injection 0.5 mL  ONCE         Last MAR action: Given - by PEARL COSME on 23 at 1403 JOVANY PERLA    23 1345 23 1334  ketorolac (TORADOL) injection 15 mg  ONCE         Last MAR action: Given - by PEARL COSME on 23 at 1339 JOVANY PERLA              Patient's response to treatment: Patient tolerated repair well.  No complications. Symptoms improved. Laceration Repair Procedure Note    Indication: Laceration    Procedure: The patient was placed in the appropriate position and anesthesia around the laceration was obtained with a full digital block of the left index finger using 1% Lidocaine without epinephrine. The area was then cleansed using chlorhexidine, irrigated with normal saline, explored with no foreign bodies discovered and no tendon injury noted, and draped in a sterile fashion. The laceration was closed with 5-0 Prolene using  Horizontal mattress and simple interrupted sutures. There were no additional lacerations requiring repair. The wound area was then dressed with bacitracin and a sterile dressing. Total repaired wound length: 3 cm. Other Items: None    The patient tolerated the procedure well. Complications: None      PLAN  Disposition: Patient will be discharged with strict return precautions and follow up instructions. Decision To Discharge 01/30/2023 03:38:56 PM     Disposition referral (if applicable):  John De Luna MD  [address] [facility]  [phone]    In 2 days      Medications prescribed (if applicable): There are no discharge medications for this patient. I considered prescribing narcotic pain medication however patient states that he will use over-the-counter medication as needed. I discussed this with the patient in shared decision making. Other treatments (if applicable): RICE therapy patient was provided an AlumaFoam splint      ===================================================================    HPI/Pertinent ROS:  Melissa Solano is a 25 y.o. male that presents with injury to the left index finger with laceration and's approximately 1.5 inches in length. Patient is unsure of his last tetanus injury. Injury occurred 20 minutes prior to arrival.  Patient was cutting metal on a band saw.   Patient is right-hand dominant      Physical Exam:  Triage VS:    ED Triage Vitals [01/30/23 1316]   Enc Vitals Group      /76      Heart Rate 66      Resp 18      Temp 97 °F (36.1 °C)      Temp Source Temporal      SpO2 97 %      Weight (!) 315 lb (142.9 kg)      Height 6' (1.829 m)      Head Circumference       Peak Flow       Pain Score       Pain Loc       Pain Edu? Excl. in [address]? General appearance:  No acute distress. Skin:  Warm. Dry. Eye:  Extraocular movements intact. Ears, nose, mouth and throat:  Oral mucosa moist   Neck:  Trachea midline. Extremity: Patient has a 1.5 inch laceration on the dorsal aspect of the left index finger. Hemostasis obtained. Tendon function intact and visualization of the tendon through full range of motion reveals no tendon injury. Patient is neurovascularly intact on the radial and ulnar aspects of the left index finger. Heart:  Regular rate and rhythm, normal S1 & S2, no extra heart sounds. Perfusion:  intact  Respiratory:  Lungs clear to auscultation bilaterally. Respirations nonlabored. Neurological:  Alert and oriented times 3. No focal neuro deficits. Psychiatric:  Appropriate    Past Medical History:   Diagnosis Date    Anxiety     Asthma     Celiac disease      Past Surgical History:   Procedure Laterality Date    CHOLECYSTECTOMY       History reviewed. No pertinent family history.   Social History     Socioeconomic History    Marital status: Single     Spouse name: Not on file    Number of children: Not on file    Years of education: Not on file    Highest education level: Not on file   Occupational History    Not on file   Tobacco Use    Smoking status: Never    Smokeless tobacco: Current     Types: Chew   Vaping Use    Vaping Use: Never used   Substance and Sexual Activity    Alcohol use: Yes     Comment: small amount daily    Drug use: No    Sexual activity: Not on file   Other Topics Concern    Not on file   Social History Narrative    Not on file     Social Determinants of Health     Financial Resource Strain: Not on file   Food Insecurity: Not on file   Transportation Needs: Not on file   Physical Activity: Not on file   Stress: Not on file   Social Connections: Not on file   Intimate Partner Violence: Not on file   Housing Stability: Not on file     No current facility-administered medications for this encounter. No current outpatient medications on file. Allergies   Allergen Reactions    Gluten Meal     Wheat Bran        Nursing Notes Reviewed    I have reviewed and interpreted all of the currently available lab results from this visit (if applicable):  No results found for this visit on 01/30/23. Radiographs (if obtained):  Radiologist's Report Reviewed:  XR FINGER LEFT (MIN 2 VIEWS)   Final Result   No discrete fracture or malalignment identified. Soft tissue swelling of the   index finger is noted. Tiny punctate foreign bodies project over the index   finger and hand, which may be overlying the skin versus superficial   subcutaneous foreign bodies. Comment: Please note this report has been produced using speech recognition software and may contain errors related to that system including errors in grammar, punctuation, and spelling, as well as words and phrases that may be inappropriate. Efforts were made to edit the dictations.        Kiko Duncan MD  02/02/23 2665

## 2023-01-30 NOTE — ED NOTES
Pt verbalized understanding of discharge instructions and follow-up care, denies any questions or concerns at this time. No new prescriptions provided; pt encouraged to return to the ED for any new or worsening symptoms.       Bailey Asher RN  01/30/23 0675

## 2023-01-30 NOTE — ED NOTES
Laceration irrigated per physician's request     Ainsley Dominguez, RN  01/30/23 [address], RN  01/30/23 9139